# Patient Record
Sex: MALE | Race: ASIAN | NOT HISPANIC OR LATINO | URBAN - METROPOLITAN AREA
[De-identification: names, ages, dates, MRNs, and addresses within clinical notes are randomized per-mention and may not be internally consistent; named-entity substitution may affect disease eponyms.]

---

## 2022-02-28 ENCOUNTER — EMERGENCY (EMERGENCY)
Facility: HOSPITAL | Age: 27
LOS: 1 days | Discharge: HOME | End: 2022-02-28
Attending: EMERGENCY MEDICINE
Payer: MEDICAID

## 2022-02-28 VITALS
TEMPERATURE: 98 F | HEART RATE: 60 BPM | SYSTOLIC BLOOD PRESSURE: 154 MMHG | OXYGEN SATURATION: 96 % | RESPIRATION RATE: 17 BRPM | DIASTOLIC BLOOD PRESSURE: 96 MMHG

## 2022-02-28 VITALS
RESPIRATION RATE: 16 BRPM | TEMPERATURE: 98 F | HEART RATE: 76 BPM | WEIGHT: 184.97 LBS | DIASTOLIC BLOOD PRESSURE: 94 MMHG | OXYGEN SATURATION: 100 % | HEIGHT: 70 IN | SYSTOLIC BLOOD PRESSURE: 142 MMHG

## 2022-02-28 LAB
ALBUMIN SERPL ELPH-MCNC: 4.9 G/DL — SIGNIFICANT CHANGE UP (ref 3.3–5)
ALP SERPL-CCNC: 74 U/L — SIGNIFICANT CHANGE UP (ref 40–120)
ALT FLD-CCNC: 24 U/L — SIGNIFICANT CHANGE UP (ref 10–45)
ANION GAP SERPL CALC-SCNC: 13 MMOL/L — SIGNIFICANT CHANGE UP (ref 5–17)
ANISOCYTOSIS BLD QL: SLIGHT — SIGNIFICANT CHANGE UP
APPEARANCE UR: CLEAR — SIGNIFICANT CHANGE UP
AST SERPL-CCNC: 19 U/L — SIGNIFICANT CHANGE UP (ref 10–40)
BACTERIA # UR AUTO: NEGATIVE — SIGNIFICANT CHANGE UP
BASOPHILS # BLD AUTO: 0 K/UL — SIGNIFICANT CHANGE UP (ref 0–0.2)
BASOPHILS NFR BLD AUTO: 0 % — SIGNIFICANT CHANGE UP (ref 0–2)
BILIRUB SERPL-MCNC: 1 MG/DL — SIGNIFICANT CHANGE UP (ref 0.2–1.2)
BILIRUB UR-MCNC: NEGATIVE — SIGNIFICANT CHANGE UP
BUN SERPL-MCNC: 14 MG/DL — SIGNIFICANT CHANGE UP (ref 7–23)
CALCIUM SERPL-MCNC: 10.1 MG/DL — SIGNIFICANT CHANGE UP (ref 8.4–10.5)
CHLORIDE SERPL-SCNC: 102 MMOL/L — SIGNIFICANT CHANGE UP (ref 96–108)
CO2 SERPL-SCNC: 25 MMOL/L — SIGNIFICANT CHANGE UP (ref 22–31)
COLOR SPEC: YELLOW — SIGNIFICANT CHANGE UP
CREAT SERPL-MCNC: 0.88 MG/DL — SIGNIFICANT CHANGE UP (ref 0.5–1.3)
DIFF PNL FLD: NEGATIVE — SIGNIFICANT CHANGE UP
EGFR: 123 ML/MIN/1.73M2 — SIGNIFICANT CHANGE UP
ELLIPTOCYTES BLD QL SMEAR: SLIGHT — SIGNIFICANT CHANGE UP
EOSINOPHIL # BLD AUTO: 0.51 K/UL — HIGH (ref 0–0.5)
EOSINOPHIL NFR BLD AUTO: 5.1 % — SIGNIFICANT CHANGE UP (ref 0–6)
EPI CELLS # UR: 1 /HPF — SIGNIFICANT CHANGE UP
GLUCOSE SERPL-MCNC: 86 MG/DL — SIGNIFICANT CHANGE UP (ref 70–99)
GLUCOSE UR QL: NEGATIVE — SIGNIFICANT CHANGE UP
HCT VFR BLD CALC: 46.5 % — SIGNIFICANT CHANGE UP (ref 39–50)
HGB BLD-MCNC: 14 G/DL — SIGNIFICANT CHANGE UP (ref 13–17)
HYALINE CASTS # UR AUTO: 10 /LPF — HIGH (ref 0–2)
KETONES UR-MCNC: ABNORMAL
LEUKOCYTE ESTERASE UR-ACNC: NEGATIVE — SIGNIFICANT CHANGE UP
LYMPHOCYTES # BLD AUTO: 2.29 K/UL — SIGNIFICANT CHANGE UP (ref 1–3.3)
LYMPHOCYTES # BLD AUTO: 23.1 % — SIGNIFICANT CHANGE UP (ref 13–44)
MANUAL SMEAR VERIFICATION: SIGNIFICANT CHANGE UP
MCHC RBC-ENTMCNC: 18.6 PG — LOW (ref 27–34)
MCHC RBC-ENTMCNC: 30.1 GM/DL — LOW (ref 32–36)
MCV RBC AUTO: 61.9 FL — LOW (ref 80–100)
MICROCYTES BLD QL: SIGNIFICANT CHANGE UP
MONOCYTES # BLD AUTO: 0.67 K/UL — SIGNIFICANT CHANGE UP (ref 0–0.9)
MONOCYTES NFR BLD AUTO: 6.8 % — SIGNIFICANT CHANGE UP (ref 2–14)
NEUTROPHILS # BLD AUTO: 6.35 K/UL — SIGNIFICANT CHANGE UP (ref 1.8–7.4)
NEUTROPHILS NFR BLD AUTO: 64.1 % — SIGNIFICANT CHANGE UP (ref 43–77)
NITRITE UR-MCNC: NEGATIVE — SIGNIFICANT CHANGE UP
OVALOCYTES BLD QL SMEAR: SLIGHT — SIGNIFICANT CHANGE UP
PH UR: 6 — SIGNIFICANT CHANGE UP (ref 5–8)
PLAT MORPH BLD: NORMAL — SIGNIFICANT CHANGE UP
PLATELET # BLD AUTO: 343 K/UL — SIGNIFICANT CHANGE UP (ref 150–400)
POIKILOCYTOSIS BLD QL AUTO: SIGNIFICANT CHANGE UP
POLYCHROMASIA BLD QL SMEAR: SLIGHT — SIGNIFICANT CHANGE UP
POTASSIUM SERPL-MCNC: 4.2 MMOL/L — SIGNIFICANT CHANGE UP (ref 3.5–5.3)
POTASSIUM SERPL-SCNC: 4.2 MMOL/L — SIGNIFICANT CHANGE UP (ref 3.5–5.3)
PROT SERPL-MCNC: 8.4 G/DL — HIGH (ref 6–8.3)
PROT UR-MCNC: ABNORMAL
RBC # BLD: 7.51 M/UL — HIGH (ref 4.2–5.8)
RBC # FLD: 19 % — HIGH (ref 10.3–14.5)
RBC BLD AUTO: ABNORMAL
RBC CASTS # UR COMP ASSIST: 4 /HPF — SIGNIFICANT CHANGE UP (ref 0–4)
SODIUM SERPL-SCNC: 140 MMOL/L — SIGNIFICANT CHANGE UP (ref 135–145)
SP GR SPEC: 1.03 — HIGH (ref 1.01–1.02)
TARGETS BLD QL SMEAR: SLIGHT — SIGNIFICANT CHANGE UP
UROBILINOGEN FLD QL: ABNORMAL
VARIANT LYMPHS # BLD: 0.9 % — SIGNIFICANT CHANGE UP (ref 0–6)
WBC # BLD: 9.91 K/UL — SIGNIFICANT CHANGE UP (ref 3.8–10.5)
WBC # FLD AUTO: 9.91 K/UL — SIGNIFICANT CHANGE UP (ref 3.8–10.5)
WBC UR QL: 2 /HPF — SIGNIFICANT CHANGE UP (ref 0–5)

## 2022-02-28 PROCEDURE — 93975 VASCULAR STUDY: CPT

## 2022-02-28 PROCEDURE — 99284 EMERGENCY DEPT VISIT MOD MDM: CPT | Mod: 25

## 2022-02-28 PROCEDURE — 99285 EMERGENCY DEPT VISIT HI MDM: CPT

## 2022-02-28 PROCEDURE — 87389 HIV-1 AG W/HIV-1&-2 AB AG IA: CPT

## 2022-02-28 PROCEDURE — 87591 N.GONORRHOEAE DNA AMP PROB: CPT

## 2022-02-28 PROCEDURE — 76870 US EXAM SCROTUM: CPT

## 2022-02-28 PROCEDURE — 87086 URINE CULTURE/COLONY COUNT: CPT

## 2022-02-28 PROCEDURE — 80053 COMPREHEN METABOLIC PANEL: CPT

## 2022-02-28 PROCEDURE — 76870 US EXAM SCROTUM: CPT | Mod: 26

## 2022-02-28 PROCEDURE — 87491 CHLMYD TRACH DNA AMP PROBE: CPT

## 2022-02-28 PROCEDURE — 85025 COMPLETE CBC W/AUTO DIFF WBC: CPT

## 2022-02-28 PROCEDURE — 93975 VASCULAR STUDY: CPT | Mod: 26

## 2022-02-28 PROCEDURE — 81001 URINALYSIS AUTO W/SCOPE: CPT

## 2022-02-28 NOTE — ED ADULT TRIAGE NOTE - CHIEF COMPLAINT QUOTE
Pt has had abd pain and testicle pian since Friday Went to  on Saturday and urine was negative and MD evaluated Testicles no issues Pt has no swelling Pt was n/v yesrday and now able to tolerated breakfast today no vomiting feeling better today but testicle still with some pain

## 2022-02-28 NOTE — ED PROVIDER NOTE - RAPID ASSESSMENT
26 M c/o intermittent bilateral testicular pain radiating to abdomen x a few days. No self-noted swelling or other abnormalities in testicular region.    **Pt seen in the waiting room via teletriage by Marvel Arevalo (MD), documentation completed by Dulceibtisha -Brad Serna. Pt to be sent to main ED for further evaluation - all orders placed to be followed by MD in the main ED**  Scribe Statement: Daphne NICOLE Cole (scribe), attest that this documentation has been prepared under the direction and in the presence of Marvel Arevalo (MD) 26 M c/o intermittent bilateral testicular pain radiating to abdomen x a few days. No self-noted swelling or other abnormalities in testicular region.    **Pt seen in the waiting room via teletriage by Marvel Arevalo (MD), documentation completed by Scribtisha -Brad Serna. Pt to be sent to main ED for further evaluation - all orders placed to be followed by MD in the main ED**  Scribe Statement: IDaphne Cole (scribe), attest that this documentation has been prepared under the direction and in the presence of Marvel Arevalo (MD)    This was a preliminary evaluation by telemedicine in the waiting room.  Please see full Emergency Department evaluation for complete evaluation, diagnosis and disposition.  Rapid assessment entered by scribtisha, reviewed and edited/corrected as needed by myself.  Marvel Arevalo M.D.

## 2022-02-28 NOTE — ED PROVIDER NOTE - CLINICAL SUMMARY MEDICAL DECISION MAKING FREE TEXT BOX
Miguel Dalal): Healthy 26y M with no significant PMHx not currently sexually active, has sex with women with consistent condom use presents with generalized malaise, 1 episode of n/v on Friday along with bilateral testicular pain, heaviness, and discomfort that has extended into the lower abdominal area. Has been able to tolerate PO since. Denies fevers, CP, SOB, hematuria, change in bowel habits, alcohol use. Endorses hookah use and some marijuana use. Reports no prior symptoms similar to this episode. Exam is noteable for well appearing and no acute distress. Soft non tender abdomen, no CVA tenderness, or midline tenderness over the spine. Unremarkable testicular exam. No evidence of hernia on examination. Differential diagnosis includes torsion, intermittent torsion, and UTI, including epididymitis. Will send UA, check US. If source of pain is not identified patient will followup with PCP. Patient is agreeable to the plan.

## 2022-02-28 NOTE — ED PROVIDER NOTE - PATIENT PORTAL LINK FT
You can access the FollowMyHealth Patient Portal offered by [facility] by registering at the following website: [email or web address]. By joining [facility]’s FollowMyHealth portal, you will also be able to view your health information using other applications (apps) compatible with our system.

## 2022-02-28 NOTE — ED PROVIDER NOTE - NSFOLLOWUPINSTRUCTIONS_ED_ALL_ED_FT
You were seen in the Emergency Department for testicular pain.     1) Advance activity as tolerated.   2) Continue all previously prescribed medications as directed.    3) Follow up with your primary care physician in 24-48 hours - take copies of your results.    4) Return to the Emergency Department for worsening or persistent symptoms, and/or ANY NEW OR CONCERNING SYMPTOMS. You were seen in the Emergency Department for testicular pain.     1) Advance activity as tolerated.   2) Continue all previously prescribed medications as directed.    3) Follow up with your primary care physician in 24-48 hours - take copies of your results.    4) Return to the Emergency Department for worsening or persistent symptoms, and/or ANY NEW OR CONCERNING SYMPTOMS.    your ultrasound showed no signs of testicular torsion or infection of the testicles or epididymis.     Your blood work did not show any signs of infection or damage to your kidneys or liver.     There was no blood in the urine which lowers the suspicion for kidney stone.     The remainder of your lab work is still pending, and you will receive a call with your results.     If your symptoms worsen or change please seek immediate medical attention.

## 2022-02-28 NOTE — ED PROVIDER NOTE - PHYSICAL EXAMINATION
General: non-toxic, NAD  HEENT: NCAT, PERRL, +post nasal drip no tonsillar erythema/enlargement/exudate  Cardiac: RRR, no murmurs, 2+ peripheral pulses  Resp: CTAB  Abdomen: soft, non-distended, bowel sounds present, no ttp, no rebound or guarding. no organomegaly no CVAT  : testicles in normal lie. no tenderness to palpation of the testes or epididymis. cremaster reflex intact bilaterally. no rashes or penile discharge noted. no palpable inguinal hernias.   Extremities: no peripheral edema, calf tenderness, or leg size discrepancies  Skin: no rashes  Neuro: AAOx4, 5+motor, sensation grossly intact  Psych: mood and affect appropriate

## 2022-03-01 LAB
C TRACH RRNA SPEC QL NAA+PROBE: SIGNIFICANT CHANGE UP
HIV 1+2 AB+HIV1 P24 AG SERPL QL IA: SIGNIFICANT CHANGE UP
N GONORRHOEA RRNA SPEC QL NAA+PROBE: SIGNIFICANT CHANGE UP

## 2022-03-03 LAB
CULTURE RESULTS: NO GROWTH — SIGNIFICANT CHANGE UP
SPECIMEN SOURCE: SIGNIFICANT CHANGE UP

## 2022-07-20 NOTE — ED PROVIDER NOTE - OBJECTIVE STATEMENT
Referral made to  in gynecology.    It is unlikely that the lymph nodes are causing the right-sided abdominal pain.   26y M with no significant PMHx presents to the ED with intermittent bilateral testicular pain radiating to the abdomen x3 days. Pain is described as aching. Patient also notes some mild L sided back pain, chills. Patient went to urgent care 2 days ago, where UA was unremarkable and patient was referred to the ED for imaging. Yesterday, patient endorses vomiting and difficulty tolerating PO. Denies green colored vomit, blood. Denies similar symptoms in the past, rashes, dysuria, diarrhea, SOB, fevers, recent illness, use of pain medications today, history of STIs. Endorses hookah and sporadic marijuana use. Patient is sexually active but denies any activity in recent months. NKDA.

## 2022-11-15 ENCOUNTER — EMERGENCY (EMERGENCY)
Facility: HOSPITAL | Age: 27
LOS: 1 days | Discharge: HOME | End: 2022-11-15
Attending: EMERGENCY MEDICINE | Admitting: EMERGENCY MEDICINE

## 2022-11-15 VITALS
HEART RATE: 58 BPM | TEMPERATURE: 98 F | OXYGEN SATURATION: 98 % | SYSTOLIC BLOOD PRESSURE: 104 MMHG | RESPIRATION RATE: 22 BRPM | DIASTOLIC BLOOD PRESSURE: 55 MMHG

## 2022-11-15 PROCEDURE — 70450 CT HEAD/BRAIN W/O DYE: CPT | Mod: 26,MA

## 2022-11-15 PROCEDURE — 71046 X-RAY EXAM CHEST 2 VIEWS: CPT | Mod: 26

## 2022-11-15 PROCEDURE — 99285 EMERGENCY DEPT VISIT HI MDM: CPT

## 2022-11-15 PROCEDURE — 93010 ELECTROCARDIOGRAM REPORT: CPT

## 2022-11-15 PROCEDURE — 71101 X-RAY EXAM UNILAT RIBS/CHEST: CPT | Mod: 26,RT

## 2022-11-15 RX ORDER — ACETAMINOPHEN 500 MG
975 TABLET ORAL ONCE
Refills: 0 | Status: COMPLETED | OUTPATIENT
Start: 2022-11-15 | End: 2022-11-15

## 2022-11-15 RX ADMIN — Medication 975 MILLIGRAM(S): at 12:49

## 2022-11-15 NOTE — ED ADULT TRIAGE NOTE - CHIEF COMPLAINT QUOTE
p/t c/o of rt sided rib pain s/p slip and fall in tub, this am positive LOC, p/t appears uncomfortable, no neuro deficits noted

## 2022-11-15 NOTE — ED PROVIDER NOTE - NSFOLLOWUPINSTRUCTIONS_ED_ALL_ED_FT
Concussion, Adult    A series of images showing how the quick head movements of a concussion injure the brain.   A concussion is a brain injury from a hard, direct hit (trauma) to the head or body. This direct hit causes the brain to shake quickly back and forth inside the skull. This can damage brain cells and cause chemical changes in the brain. A concussion may also be known as a mild traumatic brain injury (TBI).    Concussions are usually not life-threatening, but the effects of a concussion can be serious. If you have a concussion, you should be very careful to avoid having a second concussion.      What are the causes?    This condition is caused by:•A direct hit to your head, such as:  •Running into another player during a game.      •Being hit in a fight.      •Hitting your head on a hard surface.        •Sudden movement of your body that causes your brain to move back and forth inside the skull, such as in a car crash.        What are the signs or symptoms?    The signs of a concussion can be hard to notice. Early on, they may be missed by you, family members, and health care providers. You may look fine on the outside but may act or feel differently.    Every head injury is different. Symptoms are usually temporary but may last for days, weeks, or even months. Some symptoms appear right away, but other symptoms may not show up for hours or days. If your symptoms last longer than normal, you may have post-concussion syndrome.    Physical symptoms     •Headaches.      •Dizziness and problems with coordination or balance.      •Sensitivity to light or noise.      •Nausea or vomiting.      •Tiredness (fatigue).      •Vision or hearing problems.      •Changes in eating or sleeping patterns.      •Seizure.      Mental and emotional symptoms     •Irritability or mood changes.      •Memory problems.      •Trouble concentrating, organizing, or making decisions.      •Slowness in thinking, acting or reacting, speaking, or reading.      •Anxiety or depression.        How is this diagnosed?    This condition is diagnosed based on:  •Your symptoms.      •A description of your injury.      You may also have tests, including:  •Imaging tests, such as a CT scan or an MRI.      •Neuropsychological tests. These measure your thinking, understanding, learning, and remembering abilities.        How is this treated?    Treatment for this condition includes:•Stopping sports or activity if you are injured. If you hit your head or show signs of concussion:   •Do not return to sports or activities the same day.       •Get checked by a health care provider before you return to your activities.        •Physical and mental rest and careful observation, usually at home. Gradually return to your normal activities.    •Medicines to help with symptoms such as headaches, nausea, or difficulty sleeping.  •Avoid taking opioid pain medicine while recovering from a concussion.        •Avoiding alcohol and drugs. These may slow your recovery and can put you at risk of further injury.      •Referral to a concussion clinic or rehabilitation center.      Recovery from a concussion can take time. How fast you recover depends on many factors. Return to activities only when:  •Your symptoms are completely gone.      •Your health care provider says that it is safe.        Follow these instructions at home:    Activity   •Limit activities that require a lot of thought or concentration, such as:  •Doing homework or job-related work.      •Watching TV.      •Working on the computer or phone.      •Playing memory games and puzzles.      •Rest. Rest helps your brain heal. Make sure you:  •Get plenty of sleep. Most adults should get 7–9 hours of sleep each night.      •Rest during the day. Take naps or rest breaks when you feel tired.        •Avoid physical activity like exercise until your health care provider says it is safe. Stop any activity that worsens symptoms.      • Do not do high-risk activities that could cause a second concussion, such as riding a bike or playing sports.      •Ask your health care provider when you can return to your normal activities, such as school, work, athletics, and driving. Your ability to react may be slower after a brain injury. Never do these activities if you are dizzy. Your health care provider will likely give you a plan for gradually returning to activities.        General instructions   A bottle of beer, a glass of wine, and a glass of hard liquor with a "do not drink" sign over them.    •Take over-the-counter and prescription medicines only as told by your health care provider. Some medicines, such as blood thinners (anticoagulants) and aspirin, may increase the risk for complications, such as bleeding.      • Do not drink alcohol until your health care provider says you can.      •Watch your symptoms and tell others around you to do the same. Complications sometimes occur after a concussion. Older adults with a brain injury may have a higher risk of serious complications.      •Tell your , teachers, school nurse, school counselor, , or  about your injury, symptoms, and restrictions.      •Keep all follow-up visits as told by your health care provider. This is important.        How is this prevented?    Avoiding another brain injury is very important. In rare cases, another injury can lead to permanent brain damage, brain swelling, or death. The risk of this is greatest during the first 7–10 days after a head injury. Avoid injuries by:  •Stopping activities that could lead to a second concussion, such as contact or recreational sports, until your health care provider says it is okay.    •Taking these actions once you have returned to sports or activities:  •Avoiding plays or moves that can cause you to crash into another person. This is how most concussions occur.      •Following the rules and being respectful of other players. Do not engage in violent or illegal plays.        •Getting regular exercise that includes strength and balance training.      •Wearing a properly fitting helmet during sports, biking, or other activities. Helmets can help protect you from serious skull and brain injuries, but they may not protect you from a concussion. Even when wearing a helmet, you should avoid being hit in the head.        Contact a health care provider if:    •Your symptoms do not improve.      •You have new symptoms.      •You have another injury.        Get help right away if:  •You have new or worsening physical symptoms, such as:  •A severe or worsening headache.      •Weakness or numbness in any part of your body, slurred speech, vision changes, or confusion.      •Your coordination gets worse.      •Vomiting repeatedly.      •You have a seizure.      •You have unusual behavior changes.      •You lose consciousness, are sleepier than normal, or are difficult to wake up.        These symptoms may represent a serious problem that is an emergency. Do not wait to see if the symptoms will go away. Get medical help right away. Call your local emergency services (911 in the [place] Do not drive yourself to the hospital.       Summary    •A concussion is a brain injury that results from a hard, direct hit (trauma) to your head or body.      •You may have imaging tests and neuropsychological tests to diagnose a concussion.      •Treatment for this condition includes physical and mental rest and careful observation.      •Ask your health care provider when you can return to your normal activities, such as school, work, athletics, and driving.       •Get help right away if you have a severe headache, weakness in any part of the body, seizures, behavior changes, changes in vision, or if you are confused or sleepier than normal.      This information is not intended to replace advice given to you by your health care provider. Make sure you discuss any questions you have with your health care provider.

## 2022-11-15 NOTE — ED PROVIDER NOTE - PROGRESS NOTE DETAILS
Dr. Pabon: Pt states feeling better. Ambulating and tolerating PO. FRANCISCO Cook (PGY-3) - no intracranial CT findings. XR w/o fractures, IS provided to prevent atelectasis.

## 2022-11-15 NOTE — ED PROVIDER NOTE - ATTENDING CONTRIBUTION TO CARE
Pt was seen and evaluated by me. Pt is a 26 y/o male with no significant PMHx who presented to the ED with right sided chest pain and headache s/p slip and fall in shower today. Pt states he was in the shower when he slipped and hit his right chest on the shower edge with questionable LOC. Pt states since having headache and right sided chest pain. Pt denies any neck or back pain. Pt denies any UE or LE weakness. Pt denies any fever, chills, nausea, vomiting, SOB, chest pain, or abd pain.    VITALS: Vitals have been reviewed.  GEN APPEARANCE: Alert and cooperative, non-toxic appearing and in NAD  HEAD: Atraumatic, normocephalic.   EYES: PERRL  EARS: Gross hearing intact.   NOSE: No nasal discharge.   THROAT: MMM.   NECK: Supple, no lymphadenopathy  CV: RRR, S1S2, no c/r/m/g. No cyanosis or pallor.   LUNGS: CTAB. No wheezing. No rales. No rhonchi. No diminished breath sounds.   ABDOMEN: Soft, NTND. No guarding or rebound.   MSK/EXT: Spine appears normal, no spine point tenderness. Mild tenderness to right anterior chest wall.   NEURO: Alert, follows commands. Speech normal. Sensation and motor normal x4 extremities.   SKIN: Normal color for race, warm, dry and intact. No evidence of rash.  PSYCH: Normal mood and affect.  26 y/o male with no significant PMHx who presented to the ED with right sided chest pain and headache s/p slip and fall in shower today.  Concern for head injury/rib fx/rib contusion/concusion   X-ray, CT, Analgesia

## 2022-11-15 NOTE — ED PROVIDER NOTE - PATIENT PORTAL LINK FT
You can access the FollowMyHealth Patient Portal offered by [facility] by registering at the following website: [email or web address]. By joining [employer] [facility]’s FollowMyHealth portal, you will also be able to view your health information using other applications (apps) compatible with our system.

## 2022-11-15 NOTE — ED ADULT NURSE NOTE - NSIMPLEMENTINTERV_GEN_ALL_ED
Implemented All Fall Risk Interventions:  [place] to call system. Call bell, personal items and telephone within reach. Instruct patient to call for assistance. Room bathroom lighting operational. Non-slip footwear when patient is off stretcher. Physically safe environment: no spills, clutter or unnecessary equipment. Stretcher in lowest position, wheels locked, appropriate side rails in place. Provide visual cue, wrist band, yellow gown, etc. Monitor gait and stability. Monitor for mental status changes and reorient to person, place, and time. Review medications for side effects contributing to fall risk. Reinforce activity limits and safety measures with patient and family.

## 2022-11-15 NOTE — ED ADULT NURSE NOTE - OBJECTIVE STATEMENT
pt aox4, ambulatory- comes in for mechanical fall in shower this morning. pt denies hitting head, no LOC. pain to right hip and right rib region. pt tender touch. ambulates with steady gait. moving all extremities. breathing even and unlabored. no obvious deformity noted.

## 2022-11-15 NOTE — ED PROVIDER NOTE - OBJECTIVE STATEMENT
26 y/o male with no significant PMHx who presented to the ED with right sided chest pain and headache s/p slip and fall in shower today. Pt states he was in the shower when he slipped and hit his right chest on the shower edge with questionable LOC. Pt states since having headache and right sided chest pain. Pt denies any neck or back pain. Pt denies any UE or LE weakness. Pt denies any fever, chills, nausea, vomiting, SOB, chest pain, or abd pain.

## 2022-11-15 NOTE — ED PROVIDER NOTE - MUSCULOSKELETAL, MLM
Spine appears normal, range of motion is not limited, no muscle or joint tenderness. Minimal tenderness to right chest wall

## 2022-11-15 NOTE — ED PROVIDER NOTE - CLINICAL SUMMARY MEDICAL DECISION MAKING FREE TEXT BOX
26 y/o male with no significant PMHx who presented to the ED with right sided chest pain and headache s/p slip and fall in shower today.   Concern for head injury/rib fx/rib contusion/concusion   X-ray, CT, Analgesia